# Patient Record
Sex: MALE | Race: WHITE | URBAN - NONMETROPOLITAN AREA
[De-identification: names, ages, dates, MRNs, and addresses within clinical notes are randomized per-mention and may not be internally consistent; named-entity substitution may affect disease eponyms.]

---

## 2021-09-22 ENCOUNTER — OFFICE VISIT (OUTPATIENT)
Dept: URBAN - NONMETROPOLITAN AREA CLINIC 13 | Facility: CLINIC | Age: 76
End: 2021-09-22
Payer: MEDICARE

## 2021-09-22 ENCOUNTER — WEB ENCOUNTER (OUTPATIENT)
Dept: URBAN - NONMETROPOLITAN AREA CLINIC 13 | Facility: CLINIC | Age: 76
End: 2021-09-22

## 2021-09-22 ENCOUNTER — LAB OUTSIDE AN ENCOUNTER (OUTPATIENT)
Dept: URBAN - NONMETROPOLITAN AREA CLINIC 13 | Facility: CLINIC | Age: 76
End: 2021-09-22

## 2021-09-22 DIAGNOSIS — K21.9 GERD (GASTROESOPHAGEAL REFLUX DISEASE): ICD-10-CM

## 2021-09-22 PROCEDURE — 99214 OFFICE O/P EST MOD 30 MIN: CPT | Performed by: INTERNAL MEDICINE

## 2021-09-22 RX ORDER — OMEPRAZOLE 40 MG/1
1 CAPSULE 30 MINUTES BEFORE  MEALS CAPSULE, DELAYED RELEASE PELLETS ORAL BID
Qty: 180 CAPSULES | Refills: 0

## 2021-09-22 RX ORDER — ASPIRIN 81 MG/1
1 TABLET TABLET, COATED ORAL ONCE A DAY
Status: ACTIVE | COMMUNITY

## 2021-09-22 RX ORDER — FLUTICASONE PROPIONATE 50 UG/1
SPRAY, METERED NASAL
Qty: 0 | Refills: 0 | Status: ACTIVE | COMMUNITY
Start: 1900-01-01

## 2021-09-22 RX ORDER — HYDROCORTISONE 25 MG/G
APPLY A THIN LAYER TO THE AFFECTED AREA(S) BY TOPICAL ROUTE 2-4 TIMES DAILY CREAM TOPICAL
Qty: 1 | Refills: 2 | Status: DISCONTINUED | COMMUNITY
Start: 2019-06-11

## 2021-09-22 RX ORDER — OMEPRAZOLE 40 MG/1
CAPSULE, DELAYED RELEASE PELLETS ORAL
Qty: 0 | Refills: 0 | Status: ACTIVE | COMMUNITY
Start: 1900-01-01

## 2021-09-22 RX ORDER — TAMSULOSIN HYDROCHLORIDE 0.4 MG/1
1 CAPSULE CAPSULE ORAL ONCE A DAY
Status: ACTIVE | COMMUNITY

## 2021-09-22 NOTE — HPI-TODAY'S VISIT:
4/[birth year]/2019 Mr. Kristi Hilario is a very pleasant 73 year old male who is referred by Dr. Yasir Rodriguez for GERD and rectal bleeding. He was a patient of Dr. Busby in the past. He reports ongoing gas pains, belching, flatus, abdominal bloating, borborgymus and discomfort for the last 1-2 months despite taking omeprazole 40mg daily. He denies heartburn. No dysphagia. He typically does not eat dairy products and has tried stopping bread but no improvement in his sx. No antibiotics recently.   He also reports intermittent rectal bleeding. He does have history of internal and external hemorrhoids. He thinks he may have had an anal fissure in the past as well.   He last had EGD and colonoscopy by Dr. Busby in 2015 for dypshagia, rectal bleeding, and heme positive stool. EGD showed Schatzki's ring that was dilated to 51Fr with Savory. Colonoscopy showed small internal and external hemorrhoids. He was recommended to repeat colonoscopy in 10 years. TG  9/22/2021 Patient presents for follow up with worsening reflux symptoms. He continues on omeprazole 40mg once daily in the mornings. Over the last four weeks his symptoms have been more severe and frequent with burning from his epigastrum into his chest. He was unable to sleep last night due to his symptoms. No nauesa/vomiting. The omeprazole seems to keep things controlled from breakfast and lunch but around dinner he has severe breakthrough and continues through the night. No improvement with Rolaids. No dysphagia. Bowel movements are unchanged.  EGD 6/2019- normal exam; path c/w reflux but no Barretts. Colonoscopy  6/2019-nonbleeding internal hemorrhoids and sigmoid diverticulosis. Repeat in 10 years. TG

## 2021-09-28 ENCOUNTER — TELEPHONE ENCOUNTER (OUTPATIENT)
Dept: URBAN - NONMETROPOLITAN AREA CLINIC 2 | Facility: CLINIC | Age: 76
End: 2021-09-28

## 2021-10-15 ENCOUNTER — OFFICE VISIT (OUTPATIENT)
Dept: URBAN - METROPOLITAN AREA MEDICAL CENTER 1 | Facility: MEDICAL CENTER | Age: 76
End: 2021-10-15

## 2021-11-17 ENCOUNTER — OFFICE VISIT (OUTPATIENT)
Dept: URBAN - NONMETROPOLITAN AREA CLINIC 13 | Facility: CLINIC | Age: 76
End: 2021-11-17

## 2021-11-30 ENCOUNTER — OFFICE VISIT (OUTPATIENT)
Dept: URBAN - NONMETROPOLITAN AREA SURGERY CENTER 1 | Facility: SURGERY CENTER | Age: 76
End: 2021-11-30
Payer: MEDICARE

## 2021-11-30 DIAGNOSIS — K22.89 ESOPHAGEAL BLEEDING: ICD-10-CM

## 2021-11-30 DIAGNOSIS — K31.89 ACQUIRED DEFORMITY OF DUODENUM: ICD-10-CM

## 2021-11-30 PROCEDURE — G8907 PT DOC NO EVENTS ON DISCHARG: HCPCS | Performed by: INTERNAL MEDICINE

## 2021-11-30 PROCEDURE — 43239 EGD BIOPSY SINGLE/MULTIPLE: CPT | Performed by: INTERNAL MEDICINE

## 2021-12-29 ENCOUNTER — DASHBOARD ENCOUNTERS (OUTPATIENT)
Age: 76
End: 2021-12-29

## 2021-12-29 ENCOUNTER — WEB ENCOUNTER (OUTPATIENT)
Dept: URBAN - NONMETROPOLITAN AREA CLINIC 13 | Facility: CLINIC | Age: 76
End: 2021-12-29

## 2021-12-29 ENCOUNTER — OFFICE VISIT (OUTPATIENT)
Dept: URBAN - NONMETROPOLITAN AREA CLINIC 13 | Facility: CLINIC | Age: 76
End: 2021-12-29
Payer: MEDICARE

## 2021-12-29 DIAGNOSIS — K21.9 GERD (GASTROESOPHAGEAL REFLUX DISEASE): ICD-10-CM

## 2021-12-29 DIAGNOSIS — Z12.11 COLON CANCER SCREENING: ICD-10-CM

## 2021-12-29 PROCEDURE — 99213 OFFICE O/P EST LOW 20 MIN: CPT | Performed by: NURSE PRACTITIONER

## 2021-12-29 RX ORDER — OMEPRAZOLE 40 MG/1
1 CAPSULE 30 MINUTES BEFORE  MEALS CAPSULE, DELAYED RELEASE PELLETS ORAL BID
OUTPATIENT

## 2021-12-29 RX ORDER — FLUTICASONE PROPIONATE 50 UG/1
SPRAY, METERED NASAL
Qty: 0 | Refills: 0 | Status: ACTIVE | COMMUNITY
Start: 1900-01-01

## 2021-12-29 RX ORDER — OMEPRAZOLE 40 MG/1
1 CAPSULE 30 MINUTES BEFORE MORNING MEAL CAPSULE, DELAYED RELEASE PELLETS ORAL ONCE A DAY
Qty: 90 CAPSULES | Refills: 3 | OUTPATIENT
Start: 2021-12-29

## 2021-12-29 RX ORDER — ASPIRIN 81 MG/1
1 TABLET TABLET, COATED ORAL ONCE A DAY
Status: ACTIVE | COMMUNITY

## 2021-12-29 RX ORDER — TAMSULOSIN HYDROCHLORIDE 0.4 MG/1
1 CAPSULE CAPSULE ORAL ONCE A DAY
Status: ACTIVE | COMMUNITY

## 2021-12-29 RX ORDER — OMEPRAZOLE 40 MG/1
1 CAPSULE 30 MINUTES BEFORE  MEALS CAPSULE, DELAYED RELEASE PELLETS ORAL BID
Qty: 180 CAPSULES | Refills: 0 | Status: ACTIVE | COMMUNITY

## 2021-12-29 NOTE — HPI-TODAY'S VISIT:
4/18/2019 Mr. Kristi Hilario is a very pleasant 73 year old male who is referred by Dr. Yasir Rodriguez for GERD and rectal bleeding. He was a patient of Dr. Busby in the past. He reports ongoing gas pains, belching, flatus, abdominal bloating, borborgymus and discomfort for the last 1-2 months despite taking omeprazole 40mg daily. He denies heartburn. No dysphagia. He typically does not eat dairy products and has tried stopping bread but no improvement in his sx. No antibiotics recently.   He also reports intermittent rectal bleeding. He does have history of internal and external hemorrhoids. He thinks he may have had an anal fissure in the past as well.   He last had EGD and colonoscopy by Dr. Busby in 2015 for dypshagia, rectal bleeding, and heme positive stool. EGD showed Schatzki's ring that was dilated to 51Fr with Savory. Colonoscopy showed small internal and external hemorrhoids. He was recommended to repeat colonoscopy in 10 years. TG  9/22/2021 Patient presents for follow up with worsening reflux symptoms. He continues on omeprazole 40mg once daily in the mornings. Over the last four weeks his symptoms have been more severe and frequent with burning from his epigastrum into his chest. He was unable to sleep last night due to his symptoms. No nauesa/vomiting. The omeprazole seems to keep things controlled from breakfast and lunch but around dinner he has severe breakthrough and continues through the night. No improvement with Rolaids. No dysphagia. Bowel movements are unchanged.  EGD 6/2019- normal exam; path c/w reflux but no Barretts. Colonoscopy  6/2019-nonbleeding internal hemorrhoids and sigmoid diverticulosis. Repeat in 10 years. TG 12/29/2021 Patient presents for follow-up for GERD after EGD 11/30/2021 with normal-appearing esophagus, 2 cm hiatal hernia, normal stomach and duodenum.  Esophageal biopsy consistent with reflux and gastric biopsy with slight chronic inflammation. His symptoms resolved on omeprazole 40 mg twice daily.  He was able to titrate down to once daily dosing after about 4 weeks.  Since that time, his symptoms have been well controlled on just once daily dosing.  Denies dysphagia.  No nausea vomiting.  No abdominal pain.  He is tolerating diet.  He has no new complaints.  TG

## 2022-03-03 ENCOUNTER — TELEPHONE ENCOUNTER (OUTPATIENT)
Dept: URBAN - NONMETROPOLITAN AREA CLINIC 2 | Facility: CLINIC | Age: 77
End: 2022-03-03

## 2022-03-03 RX ORDER — OMEPRAZOLE 40 MG/1
1 CAPSULE 30 MINUTES BEFORE MORNING MEAL CAPSULE, DELAYED RELEASE PELLETS ORAL ONCE A DAY
Qty: 90 CAPSULES | Refills: 2
Start: 2021-12-29